# Patient Record
Sex: FEMALE | URBAN - METROPOLITAN AREA
[De-identification: names, ages, dates, MRNs, and addresses within clinical notes are randomized per-mention and may not be internally consistent; named-entity substitution may affect disease eponyms.]

---

## 2022-02-23 ENCOUNTER — APPOINTMENT (RX ONLY)
Dept: URBAN - METROPOLITAN AREA CLINIC 99 | Facility: CLINIC | Age: 32
Setting detail: DERMATOLOGY
End: 2022-02-23

## 2022-02-23 DIAGNOSIS — L24 IRRITANT CONTACT DERMATITIS: ICD-10-CM

## 2022-02-23 PROBLEM — L24.9 IRRITANT CONTACT DERMATITIS, UNSPECIFIED CAUSE: Status: ACTIVE | Noted: 2022-02-23

## 2022-02-23 PROCEDURE — ? COUNSELING

## 2022-02-23 PROCEDURE — 99203 OFFICE O/P NEW LOW 30 MIN: CPT

## 2022-02-23 PROCEDURE — ? TREATMENT REGIMEN

## 2022-02-23 PROCEDURE — ? PRESCRIPTION

## 2022-02-23 RX ORDER — PREDNISONE 10 MG/1
3,2,1 TABLET ORAL QAM
Qty: 21 | Refills: 0 | Status: ERX | COMMUNITY
Start: 2022-02-23

## 2022-02-23 RX ORDER — TRIAMCINOLONE ACETONIDE 0.25 MG/G
DAB CREAM TOPICAL BID X 2 WEEKS
Qty: 80 | Refills: 0 | Status: ERX | COMMUNITY
Start: 2022-03-09

## 2022-02-23 RX ADMIN — PREDNISONE 3,2,1: 10 TABLET ORAL at 00:00

## 2022-02-23 ASSESSMENT — LOCATION DETAILED DESCRIPTION DERM: LOCATION DETAILED: RIGHT FOREHEAD

## 2022-02-23 ASSESSMENT — LOCATION ZONE DERM: LOCATION ZONE: FACE

## 2022-02-23 ASSESSMENT — LOCATION SIMPLE DESCRIPTION DERM: LOCATION SIMPLE: RIGHT FOREHEAD

## 2022-02-23 NOTE — PROCEDURE: TREATMENT REGIMEN
Detail Level: Zone
Samples Given: Aquaphor
Samples Given: Aquaphor ointment
Discontinue Regimen: Hydrocortisone and nystatin
Otc Regimen: Dove soap
Initiate Treatment: Start 0.025% triamcinolone cream bid x 2 weeks and eye lids mix 50/50% TAC/aquaphor ointment and apply QHS x 1-2 weeks
Plan: Recheck in 2 weeks

## 2022-03-09 ENCOUNTER — APPOINTMENT (RX ONLY)
Dept: URBAN - METROPOLITAN AREA CLINIC 99 | Facility: CLINIC | Age: 32
Setting detail: DERMATOLOGY
End: 2022-03-09

## 2022-03-09 DIAGNOSIS — L24 IRRITANT CONTACT DERMATITIS: ICD-10-CM | Status: INADEQUATELY CONTROLLED

## 2022-03-09 PROBLEM — L24.9 IRRITANT CONTACT DERMATITIS, UNSPECIFIED CAUSE: Status: ACTIVE | Noted: 2022-03-09

## 2022-03-09 PROCEDURE — ? TREATMENT REGIMEN

## 2022-03-09 PROCEDURE — 99214 OFFICE O/P EST MOD 30 MIN: CPT

## 2022-03-09 PROCEDURE — ? ADDITIONAL NOTES

## 2022-03-09 PROCEDURE — ? PRESCRIPTION

## 2022-03-09 PROCEDURE — ? COUNSELING

## 2022-03-09 RX ORDER — DESONIDE 0.5 MG/G
DAB OINTMENT TOPICAL BID
Qty: 15 | Refills: 2 | Status: ERX | COMMUNITY
Start: 2022-03-09

## 2022-03-09 RX ADMIN — DESONIDE DAB: 0.5 OINTMENT TOPICAL at 00:00

## 2022-03-09 RX ADMIN — TRIAMCINOLONE ACETONIDE DAB: 0.25 CREAM TOPICAL at 00:00

## 2022-03-09 ASSESSMENT — SEVERITY ASSESSMENT 2020: SEVERITY 2020: MILD

## 2022-03-09 NOTE — PROCEDURE: TREATMENT REGIMEN
Plan: Apple cider vinegar and water 1:1 ration for 10 minutes on affected area then wash off with cold or Luke warm water.  Coat nayan-oral area with aquaphor prior to brushing teeth
Samples Given: Aquaphor ointment
Initiate Treatment: Start: desonide 0.05 % topical ointment Bid x 2 weeks
Detail Level: Zone
Continue Regimen: Aquaphor, resume previously tolerated tooth
Discontinue Regimen: 0.025% TAC cream. Charcoal tooth paste

## 2022-03-11 ENCOUNTER — APPOINTMENT (RX ONLY)
Dept: URBAN - METROPOLITAN AREA CLINIC 102 | Facility: CLINIC | Age: 32
Setting detail: DERMATOLOGY
End: 2022-03-11

## 2022-03-11 ENCOUNTER — RX ONLY (OUTPATIENT)
Age: 32
Setting detail: RX ONLY
End: 2022-03-11

## 2022-03-11 DIAGNOSIS — L24 IRRITANT CONTACT DERMATITIS: ICD-10-CM | Status: STABLE

## 2022-03-11 DIAGNOSIS — L23.2 ALLERGIC CONTACT DERMATITIS DUE TO COSMETICS: ICD-10-CM

## 2022-03-11 PROBLEM — L24.9 IRRITANT CONTACT DERMATITIS, UNSPECIFIED CAUSE: Status: ACTIVE | Noted: 2022-03-11

## 2022-03-11 PROCEDURE — 99214 OFFICE O/P EST MOD 30 MIN: CPT

## 2022-03-11 PROCEDURE — ? ADDITIONAL NOTES

## 2022-03-11 PROCEDURE — ? TREATMENT REGIMEN

## 2022-03-11 PROCEDURE — ? PRESCRIPTION

## 2022-03-11 PROCEDURE — ? COUNSELING

## 2022-03-11 RX ORDER — DESONIDE 0.5 MG/G
DAB OINTMENT TOPICAL BID
Qty: 15 | Refills: 2 | Status: CANCELLED
Stop reason: SDUPTHER

## 2022-03-11 RX ORDER — CETIRIZINE HCL 1 MG/ML
SOLUTION, ORAL ORAL QD
Qty: 30 | Refills: 0 | Status: ERX | COMMUNITY
Start: 2022-03-11

## 2022-03-11 RX ORDER — HYDROCORTISONE 25 MG/G
DAB OINTMENT TOPICAL BID
Qty: 28.35 | Refills: 0 | Status: ERX | COMMUNITY
Start: 2022-03-11

## 2022-03-11 RX ADMIN — Medication 1: at 00:00

## 2022-03-11 ASSESSMENT — LOCATION ZONE DERM: LOCATION ZONE: FACE

## 2022-03-11 ASSESSMENT — LOCATION SIMPLE DESCRIPTION DERM: LOCATION SIMPLE: RIGHT CHEEK

## 2022-03-11 ASSESSMENT — SEVERITY ASSESSMENT 2020: SEVERITY 2020: MODERATE

## 2022-03-11 ASSESSMENT — LOCATION DETAILED DESCRIPTION DERM: LOCATION DETAILED: RIGHT MEDIAL MALAR CHEEK

## 2022-03-11 NOTE — PROCEDURE: ADDITIONAL NOTES
Render Risk Assessment In Note?: no
Additional Notes: Patient rash completely resolved while on prednisone, but has started to flare. She has Recently started to use charcoal toothpaste and she was encouraged to resume her previous toothpaste that she had tolerated. Recommended patch testing with Dr. Bermeo.
Detail Level: Simple

## 2022-03-11 NOTE — PROCEDURE: TREATMENT REGIMEN
Otc Regimen: Aquaphor ointment
Initiate Treatment: Start: desonide 0.05 % topical ointment Bid x 2 weeks on back order at pharmacy getting it tomorrow.
Detail Level: Zone
Continue Regimen: Aquaphor, resume previously tolerated tooth
Plan: Apply Apple cider vinegar with water 1:1 ratio for 10 minutes to affected area daily then wash face with dove soap to rinse off
Otc Regimen: Zyrtec 10mg qd
Continue Regimen: Continue triamcinolone 0.025% bid x2 weeks

## 2023-11-07 ENCOUNTER — APPOINTMENT (RX ONLY)
Dept: URBAN - METROPOLITAN AREA CLINIC 85 | Facility: CLINIC | Age: 33
Setting detail: DERMATOLOGY
End: 2023-11-07

## 2023-11-07 DIAGNOSIS — L72.8 OTHER FOLLICULAR CYSTS OF THE SKIN AND SUBCUTANEOUS TISSUE: ICD-10-CM

## 2023-11-07 PROCEDURE — 10060 I&D ABSCESS SIMPLE/SINGLE: CPT

## 2023-11-07 PROCEDURE — ? COUNSELING

## 2023-11-07 PROCEDURE — ? INCISION AND DRAINAGE

## 2023-11-07 ASSESSMENT — LOCATION SIMPLE DESCRIPTION DERM: LOCATION SIMPLE: RIGHT UPPER BACK

## 2023-11-07 ASSESSMENT — LOCATION DETAILED DESCRIPTION DERM: LOCATION DETAILED: RIGHT INFERIOR UPPER BACK

## 2023-11-07 ASSESSMENT — LOCATION ZONE DERM: LOCATION ZONE: TRUNK

## 2023-11-07 NOTE — PROCEDURE: INCISION AND DRAINAGE
Detail Level: Detailed
Lesion Type: Abscess
Method: 11 blade
Curette: No
Anesthesia Type: 1% lidocaine with epinephrine
Anesthesia Volume In Cc: 1
Size Of Lesion In Cm (Optional But May Be Required For Some Insurances): 0.9
Drainage Amount?: moderate
Drainage Type?: keratinaceous
Wound Care: Petrolatum
Dressing: dry sterile dressing
Epidermal Sutures: 4-0 Ethilon
Epidermal Closure: simple interrupted
Suture Text: The incision was partially closed with
Preparation Text: The area was prepped in the usual clean fashion.
Curette Text (Optional): After the contents were expressed a curette was used to partially remove the cyst wall.
Consent was obtained and risks were reviewed including but not limited to delayed wound healing, infection, need for multiple I and D's, and pain.
Post-Care Instructions: I reviewed with the patient in detail post-care instructions. Patient should keep wound covered and call the office should any redness, pain, swelling or worsening occur.

## 2024-11-07 NOTE — PROCEDURE: ADDITIONAL NOTES
Please mail FMLA form to patients address on file.  Thank you! 
Render Risk Assessment In Note?: no
Additional Notes: Patient rash completely resolved while on prednisone, but has started to flare. She has Recently started to use charcoal toothpaste and she was encouraged to resume her previous toothpaste that she had tolerated. Recommended patch testing with Dr. Bermeo.
Detail Level: Simple